# Patient Record
Sex: FEMALE | Race: WHITE | URBAN - METROPOLITAN AREA
[De-identification: names, ages, dates, MRNs, and addresses within clinical notes are randomized per-mention and may not be internally consistent; named-entity substitution may affect disease eponyms.]

---

## 2020-11-19 ENCOUNTER — OFFICE VISIT (OUTPATIENT)
Dept: URBAN - METROPOLITAN AREA CLINIC 92 | Facility: CLINIC | Age: 20
End: 2020-11-19

## 2020-11-19 ENCOUNTER — WEB ENCOUNTER (OUTPATIENT)
Dept: URBAN - METROPOLITAN AREA CLINIC 96 | Facility: CLINIC | Age: 20
End: 2020-11-19

## 2020-11-19 ENCOUNTER — DASHBOARD ENCOUNTERS (OUTPATIENT)
Age: 20
End: 2020-11-19

## 2020-11-19 ENCOUNTER — OFFICE VISIT (OUTPATIENT)
Dept: URBAN - METROPOLITAN AREA CLINIC 96 | Facility: CLINIC | Age: 20
End: 2020-11-19
Payer: COMMERCIAL

## 2020-11-19 DIAGNOSIS — K60.1 CHRONIC ANAL FISSURE: ICD-10-CM

## 2020-11-19 DIAGNOSIS — K60.2 ANAL FISSURE: ICD-10-CM

## 2020-11-19 DIAGNOSIS — Z80.0 FAMILY HISTORY OF COLON CANCER: ICD-10-CM

## 2020-11-19 DIAGNOSIS — K62.5 RECTAL BLEEDING: ICD-10-CM

## 2020-11-19 PROCEDURE — G8482 FLU IMMUNIZE ORDER/ADMIN: HCPCS | Performed by: INTERNAL MEDICINE

## 2020-11-19 PROCEDURE — 99244 OFF/OP CNSLTJ NEW/EST MOD 40: CPT | Performed by: INTERNAL MEDICINE

## 2020-11-19 RX ORDER — NORETHINDRONE ACETATE AND ETHINYL ESTRADIOL AND FERROUS FUMARATE 1MG-20(21)
1 TABLET KIT ORAL ONCE A DAY
Status: ACTIVE | COMMUNITY

## 2020-11-19 RX ORDER — SODIUM, POTASSIUM,MAG SULFATES 17.5-3.13G
354 ML SOLUTION, RECONSTITUTED, ORAL ORAL
Qty: 354 ML | Refills: 0 | OUTPATIENT
Start: 2020-11-19 | End: 2020-11-20

## 2020-11-19 NOTE — HPI-TODAY'S VISIT:
The patient was referred by Dr. Liriano_for _____ .   A copy of this document is being forwarded to the referring provider. Labs reviewed from PCP this month and SED 2, CBC, LFTs WNL

## 2020-11-19 NOTE — HPI-TODAY'S VISIT:
here for 3 weeks of rectal bleeding. sometimes on tissue, sometimes in stool, miixed in or sepaate.  some anorectal pain w/ defecation can be sharp saw pcp dr. deandre espinoza who sent her here no abd pain strong famhx of CRC w/ maternal aunt and uncle w/ CRC, both diagnosed under 60, aunt was 41 yo

## 2020-11-23 ENCOUNTER — OFFICE VISIT (OUTPATIENT)
Dept: URBAN - METROPOLITAN AREA SURGERY CENTER 18 | Facility: SURGERY CENTER | Age: 20
End: 2020-11-23
Payer: COMMERCIAL

## 2020-11-23 DIAGNOSIS — Z80.0 FAMILY HISTORY MALIGNANT NEOPLASM OF BILIARY TRACT: ICD-10-CM

## 2020-11-23 DIAGNOSIS — K60.2 ANAL FISSURE: ICD-10-CM

## 2020-11-23 DIAGNOSIS — K92.1 BLACK STOOL: ICD-10-CM

## 2020-11-23 PROCEDURE — 45378 DIAGNOSTIC COLONOSCOPY: CPT | Performed by: INTERNAL MEDICINE

## 2020-11-23 PROCEDURE — G9937 DIG OR SURV COLSCO: HCPCS | Performed by: INTERNAL MEDICINE

## 2020-11-23 PROCEDURE — G8907 PT DOC NO EVENTS ON DISCHARG: HCPCS | Performed by: INTERNAL MEDICINE

## 2020-11-23 RX ORDER — NORETHINDRONE ACETATE AND ETHINYL ESTRADIOL AND FERROUS FUMARATE 1MG-20(21)
1 TABLET KIT ORAL ONCE A DAY
Status: ACTIVE | COMMUNITY